# Patient Record
Sex: MALE | Race: WHITE | Employment: OTHER | URBAN - METROPOLITAN AREA
[De-identification: names, ages, dates, MRNs, and addresses within clinical notes are randomized per-mention and may not be internally consistent; named-entity substitution may affect disease eponyms.]

---

## 2021-04-09 ENCOUNTER — TRANSFERRED RECORDS (OUTPATIENT)
Dept: HEALTH INFORMATION MANAGEMENT | Facility: CLINIC | Age: 86
End: 2021-04-09

## 2021-04-16 ENCOUNTER — TRANSFERRED RECORDS (OUTPATIENT)
Dept: HEALTH INFORMATION MANAGEMENT | Facility: CLINIC | Age: 86
End: 2021-04-16

## 2021-04-16 ENCOUNTER — MEDICAL CORRESPONDENCE (OUTPATIENT)
Dept: HEALTH INFORMATION MANAGEMENT | Facility: CLINIC | Age: 86
End: 2021-04-16

## 2021-04-19 ENCOUNTER — TELEPHONE (OUTPATIENT)
Dept: OPHTHALMOLOGY | Facility: CLINIC | Age: 86
End: 2021-04-19

## 2021-04-19 ENCOUNTER — TRANSCRIBE ORDERS (OUTPATIENT)
Dept: OTHER | Age: 86
End: 2021-04-19

## 2021-04-19 DIAGNOSIS — C44.111 BASAL CELL CARCINOMA (BCC) OF LEFT EYELID: Primary | ICD-10-CM

## 2021-04-19 NOTE — TELEPHONE ENCOUNTER
Spoke with patient's wife regarding referral to Eye Clinic for Basal cell carcinoma (BCC) of left area where nose and eye meet and collects eye matter-within 2 weeks Biopsy was positive done with  at [facility] on 4/7/2021. Scheduled patient accordingly and sent appointment letter and map to email provided. Completed intake questions and will patient tomorrow for Insurance Information.-Per Patient's Wife

## 2021-04-19 NOTE — TELEPHONE ENCOUNTER
[facility] Call Center    Phone Message    May a detailed message be left on voicemail: no     Reason for Call: Appointment Intake    Referring Provider Name: Lalit Lowe from [facility] referring to Dr. Coy  Diagnosis and/or Symptoms: BCC, left eyelid    Requesting appointment within 2 weeks    Action Taken: Message routed to:  [facility] ([facility]): [facility] OPHTHALMOLOGY ADULT [facility] [[id number]]    Travel Screening: Not Applicable

## 2021-04-20 NOTE — TELEPHONE ENCOUNTER
FUTURE VISIT INFORMATION      FUTURE VISIT INFORMATION:    Date: 4/27/21    Time: 10:00am    Location: [facility]  REFERRAL INFORMATION:    Referring provider:  Dr. Lowe    Referring providers clinic:  [facility]    Reason for visit/diagnosis  BCC    RECORDS REQUESTED FROM:       Clinic name Comments Records Status Imaging Status   [facility] Request for recs/ Path sent 4/20- received and sent to scanning 4/21 EPIC

## 2021-04-27 ENCOUNTER — HOSPITAL ENCOUNTER (OUTPATIENT)
Facility: CLINIC | Age: 86
End: 2021-04-27
Attending: OPHTHALMOLOGY | Admitting: OPHTHALMOLOGY
Payer: MEDICARE

## 2021-04-27 ENCOUNTER — TELEPHONE (OUTPATIENT)
Dept: OPHTHALMOLOGY | Facility: CLINIC | Age: 86
End: 2021-04-27

## 2021-04-27 ENCOUNTER — OFFICE VISIT (OUTPATIENT)
Dept: OPHTHALMOLOGY | Facility: CLINIC | Age: 86
End: 2021-04-27
Attending: OPHTHALMOLOGY
Payer: MEDICARE

## 2021-04-27 ENCOUNTER — PRE VISIT (OUTPATIENT)
Dept: OPHTHALMOLOGY | Facility: CLINIC | Age: 86
End: 2021-04-27

## 2021-04-27 DIAGNOSIS — C44.111 BASAL CELL CARCINOMA (BCC) OF LEFT EYELID: ICD-10-CM

## 2021-04-27 PROBLEM — C44.91 NODULAR BASAL CELL CARCINOMA (BCC): Status: ACTIVE | Noted: 2021-04-27

## 2021-04-27 PROBLEM — Z98.890 STATUS POST YAG CAPSULOTOMY OF BOTH EYES: Status: ACTIVE | Noted: 2018-11-12

## 2021-04-27 PROBLEM — R73.03 PRE-DIABETES: Status: ACTIVE | Noted: 2020-07-16

## 2021-04-27 PROBLEM — H52.229 REGULAR ASTIGMATISM: Status: ACTIVE | Noted: 2018-09-17

## 2021-04-27 PROBLEM — N18.30 STAGE 3 CHRONIC KIDNEY DISEASE (H): Status: ACTIVE | Noted: 2021-04-27

## 2021-04-27 PROBLEM — E11.9 DIABETES MELLITUS (H): Status: ACTIVE | Noted: 2021-04-27

## 2021-04-27 PROBLEM — Z23 ENCOUNTER FOR IMMUNIZATION: Status: ACTIVE | Noted: 2021-02-18

## 2021-04-27 PROBLEM — M19.90 OSTEOARTHRITIS: Status: ACTIVE | Noted: 2021-04-27

## 2021-04-27 PROBLEM — I10 HYPERTENSION: Status: ACTIVE | Noted: 2020-07-16

## 2021-04-27 PROBLEM — Z96.1 PSEUDOPHAKIA: Status: ACTIVE | Noted: 2017-01-20

## 2021-04-27 PROBLEM — H40.009 GLAUCOMA SUSPECT: Status: ACTIVE | Noted: 2021-04-27

## 2021-04-27 PROBLEM — R42 VERTIGO: Status: ACTIVE | Noted: 2021-04-27

## 2021-04-27 PROBLEM — H52.4 PRESBYOPIA: Status: ACTIVE | Noted: 2021-04-27

## 2021-04-27 PROBLEM — G47.00 INSOMNIA: Status: ACTIVE | Noted: 2021-04-27

## 2021-04-27 PROBLEM — M54.50 LOW BACK PAIN: Status: ACTIVE | Noted: 2021-04-27

## 2021-04-27 PROBLEM — H11.30 SUBCONJUNCTIVAL HEMORRHAGE: Status: ACTIVE | Noted: 2020-02-25

## 2021-04-27 PROBLEM — D48.5 NEOPLASM OF UNCERTAIN BEHAVIOR OF SKIN OF EYELID: Status: ACTIVE | Noted: 2021-04-27

## 2021-04-27 PROCEDURE — 99204 OFFICE O/P NEW MOD 45 MIN: CPT | Performed by: OPHTHALMOLOGY

## 2021-04-27 PROCEDURE — 92285 EXTERNAL OCULAR PHOTOGRAPHY: CPT | Performed by: OPHTHALMOLOGY

## 2021-04-27 RX ORDER — WARFARIN SODIUM 7.5 MG/1
TABLET ORAL
COMMUNITY
Start: 2021-04-05

## 2021-04-27 RX ORDER — METOPROLOL SUCCINATE 50 MG/1
50 TABLET, EXTENDED RELEASE ORAL DAILY
COMMUNITY
Start: 2021-04-17

## 2021-04-27 RX ORDER — DILTIAZEM HYDROCHLORIDE 240 MG/1
1 CAPSULE, EXTENDED RELEASE ORAL EVERY 24 HOURS
COMMUNITY
Start: 2020-05-11

## 2021-04-27 RX ORDER — LOSARTAN POTASSIUM AND HYDROCHLOROTHIAZIDE 25; 100 MG/1; MG/1
1 TABLET ORAL DAILY
COMMUNITY
Start: 2021-04-01

## 2021-04-27 ASSESSMENT — TONOMETRY
OD_IOP_MMHG: 18
IOP_METHOD: ICARE
OS_IOP_MMHG: 20

## 2021-04-27 ASSESSMENT — VISUAL ACUITY
OD_SC: 20/20
METHOD: SNELLEN - LINEAR
OD_SC+: -1
OS_SC+: -1
OS_SC: 20/40

## 2021-04-27 ASSESSMENT — CONF VISUAL FIELD
OD_NORMAL: 1
METHOD: COUNTING FINGERS
OS_NORMAL: 1

## 2021-04-27 NOTE — NURSING NOTE
"Chief Complaints and History of Present Illnesses   Patient presents with     Consult For     BCC LE     Chief Complaint(s) and History of Present Illness(es)     Consult For     Laterality: left eye    Associated symptoms: Negative for eye pain, itching, discharge and burning    Treatments tried: no treatments    Pain scale: 0/10    Comments: BCC LE              Comments     Yovanny Welch is being seen for a consult today by the request of Dr. Lowe for Basal cell carcinoma.  Pt notes that \"spot' was present for a few months at least before being bx, not having any irritation, discharge, tearing, or mattering, used EES jessy for about 4 days after bx, not using anything currently.    Veronica Gill COT April 27, 2021 9:59 AM                    "

## 2021-04-27 NOTE — LETTER
" 2021         RE:  :  MRN: Yovanny Welch  10/26/1930  [id number]     Dear Dr. Lowe,    Thank you for asking me to see your patient, Yovanny Welch, for an oculoplastic   consultation.  My assessment and plan are below.  For further details, please see my attached clinic note.      CC: Basal Cell Carcinoma     Chief Complaint(s) and History of Present Illness(es)     Consult For     Laterality: left eye    Associated symptoms: Negative for eye pain, itching, discharge and   burning    Treatments tried: no treatments    Pain scale: 0/10    Comments: BCC LE           Comments     Yovanny Welch is being seen for a consult today by the request of Dr. Lowe for Basal cell carcinoma.  Pt notes that \"spot' was present for a few   months at least before being bx, not having any irritation, discharge,   tearing, or mattering, used EES jessy for about 4 days after bx, not using   anything currently.    Veronica Gill COT 2021 9:59 AM            HPI:     Enlarging: Yes  Irritating to eyelashes and catches in folds of skin: No  Bleeding: Yes  Prior cutaneous malignancy: Yes  Immunosuppression: No    Patient is a 89 y/o M who presents to the oculoplastics clinic in the setting of a basal cell carcinoma on the left side of upper nose.     Reports that he has been putting erythromycin ointment to the lesion.    Patient reports that the lesion has been present I the medial aspect of nose, medial to the left eye - for perhaps a year. He reports the lesion was slowly growing in size. He denies ay pain in the area. The lesion had bleeding one time, and it felt like it \"drained\", then the lesion became bigger again. He denies any irritation in the area prior to the removal.      Reports that he wears glasses - and initially thought it was an irritation of the lens. He reports a hx of possibly a precancerous lesion x 2 - one in his left arm, and one on his scalp - one was excised and the other frozen off. No other cancer " hx and no family history of this.     Has a hx of CE IOL - with symphony.     Dr. Lowe perfomed an excisional biopsy of the lesion.     On Warfarin and has pacemaker.            Assessment and Plan:   1. 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 Biopsy results depict this represents BCC - nodular subtype.   - Lesion has been present for about a year at this juncture - with associated bleeding.    Pathology result: 4/9/2021  Nodular type- Basal Cell Carcinoma - extending to a peripheral margin.     Plan:  - Given lesion, will plan for Mohs evaluation and possible reconstruction following removal.     Luis Hsu MD  Department of Ophthalmology  Pager: [phone]    Plan Mohs left medial canthus, and reconstruction.   Discussed possibility of lacrimal involvement.       Again, thank you for allowing me to participate in the care of your patient.      Sincerely,    Aliyah Coy MD  Department of Ophthalmology and Visual Neurosciences  [facility]    CC: Lalit Lowe  [facility]  [address] [facility] [place] [address]  Via Fax: [phone]

## 2021-04-27 NOTE — PROGRESS NOTES
"Oculoplastic Clinic New Patient    Patient: Yovanny Welch MRN# [id number]   YOB: 1930 Age: 89 year old   Date of Visit: Apr 27, 2021         CC: Basal Cell Carcinoma     Chief Complaint(s) and History of Present Illness(es)     Consult For     Laterality: left eye    Associated symptoms: Negative for eye pain, itching, discharge and   burning    Treatments tried: no treatments    Pain scale: 0/10    Comments: BCC LE           Comments     Yovanny Welch is being seen for a consult today by the request of Dr. Lowe for Basal cell carcinoma.  Pt notes that \"spot' was present for a few   months at least before being bx, not having any irritation, discharge,   tearing, or mattering, used EES jessy for about 4 days after bx, not using   anything currently.    Veronica Gill COT April 27, 2021 9:59 AM            HPI:     Enlarging: Yes  Irritating to eyelashes and catches in folds of skin: No  Bleeding: Yes  Prior cutaneous malignancy: Yes  Immunosuppression: No    Patient is a 89 y/o M who presents to the oculoplastics clinic in the setting of a basal cell carcinoma on the left side of upper nose.     Reports that he has been putting erythromycin ointment to the lesion.    Patient reports that the lesion has been present I the medial aspect of nose, medial to the left eye - for perhaps a year. He reports the lesion was slowly growing in size. He denies ay pain in the area. The lesion had bleeding one time, and it felt like it \"drained\", then the lesion became bigger again. He denies any irritation in the area prior to the removal.      Reports that he wears glasses - and initially thought it was an irritation of the lens. He reports a hx of possibly a precancerous lesion x 2 - one in his left arm, and one on his scalp - one was excised and the other frozen off. No other cancer hx and no family history of this.     Has a hx of CE IOL - with symphony.     Dr. Lowe perfomed an excisional biopsy of the lesion. "     On Warfarin and has pacemaker.            Assessment and Plan:   1. 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 Biopsy results depict this represents BCC - nodular subtype.   - Lesion has been present for about a year at this juncture - with associated bleeding.    Pathology result: 4/9/2021  Nodular type- Basal Cell Carcinoma - extending to a peripheral margin.     Plan:  - Given lesion, will plan for Mohs evaluation and possible reconstruction following removal.     Luis Hsu MD  Department of Ophthalmology  Pager: [phone]    Plan Mohs left medial canthus, and reconstruction.   Discussed possibility of lacrimal involvement.     Will need to hold coumadin INR<2  Has pacemaker.        Attending Physician Attestation: Complete documentation of historical and exam elements from today's encounter can be found in the full encounter summary report (not reduplicated in this progress note). I personally obtained the chief complaint(s) and history of present illness. I confirmed and edited as necessary the review of systems, past medical/surgical history, family history, social history, and examination findings as documented by others; and I examined the patient myself. I personally reviewed the relevant tests, images, and reports as documented above. I formulated and edited as necessary the assessment and plan and discussed the findings and management plan with the patient. Aliyah Coy MD    Today with Yovanny Welch and his wife Madelyn, I reviewed the indications, risks, benefits, and alternatives of the proposed surgical procedure including, but not limited to, failure obtain the desired result  and need for additional surgery, bleeding, infection, injury to the eye, and the remote possibility of permanent damage to any organ system with the use of anesthesia. Also discussed possibility of  need to reconstruct lacrimal system. I provided multiple opportunities for the questions, answered all questions to the best of my ability, and confirmed that my answers and my discussion were understood. Aliyah Coy MD

## 2021-04-27 NOTE — TELEPHONE ENCOUNTER
Spoke with patient to schedule surgery with Dr. Coy  Surgery was scheduled on 6/4 at [facility]  Patient will have H&P at [facility]     Patient is aware a COVID-19 test is needed before their procedure. The test should be with-in 4 days of their procedure.   Test Details: Date 6/1 Location [facility]    Post-Op visit was scheduled on 6/22  Patient is aware a / is needed day of surgery.   Surgery packet was mailed 4/27, patient has my direct contact information for any further questions.

## 2021-04-29 NOTE — TELEPHONE ENCOUNTER
FUTURE VISIT INFORMATION      FUTURE VISIT INFORMATION:    Date: 6.3.21    Time: 8:30    Location: [facility]  REFERRAL INFORMATION:    Referring provider:  Dr. Leonardo Lowe    Referring providers clinic:  [facility]    Reason for visit/diagnosis  BCC Left Eye, Dr. Coy to do procedure Next day at [facility]     RECORDS REQUESTED FROM:       Clinic name Comments Records Status Photos Status   [facility] Ophthalmology 4.27.21  Dr. Coy [facility]   [facility] 4.9.21  Dr. Leonardo Lowe  Path # [id number] Received  Sent to scanning na                             Action 4.29.21 jm   Action Taken Faxed records/photos request to [facility] at [phone]    Records received- sent to scanning. Also put in Dr. Orozco's rightx medical records tab.

## 2021-05-19 DIAGNOSIS — Z11.59 ENCOUNTER FOR SCREENING FOR OTHER VIRAL DISEASES: ICD-10-CM

## 2021-06-03 ENCOUNTER — OFFICE VISIT (OUTPATIENT)
Dept: DERMATOLOGY | Facility: CLINIC | Age: 86
End: 2021-06-03
Payer: MEDICARE

## 2021-06-03 ENCOUNTER — PRE VISIT (OUTPATIENT)
Dept: DERMATOLOGY | Facility: CLINIC | Age: 86
End: 2021-06-03

## 2021-06-03 VITALS — HEART RATE: 90 BPM | DIASTOLIC BLOOD PRESSURE: 82 MMHG | SYSTOLIC BLOOD PRESSURE: 125 MMHG

## 2021-06-03 DIAGNOSIS — C44.1192 BASAL CELL CARCINOMA (BCC) OF LEFT LOWER EYELID: Primary | ICD-10-CM

## 2021-06-03 PROCEDURE — 14060 TIS TRNFR E/N/E/L 10 SQ CM/<: CPT | Performed by: DERMATOLOGY

## 2021-06-03 PROCEDURE — 17311 MOHS 1 STAGE H/N/HF/G: CPT | Performed by: DERMATOLOGY

## 2021-06-03 ASSESSMENT — PAIN SCALES - GENERAL: PAINLEVEL: NO PAIN (0)

## 2021-06-03 NOTE — NURSING NOTE
Chief Complaint   Patient presents with     Derm Problem     Patient is here today for mohs on left eye BCC.      Yazmin VOSS CMA

## 2021-06-03 NOTE — PATIENT INSTRUCTIONS
Wound care    I will experience scar, bleeding, swelling, pain, crusting and redness. I may experience incomplete removal or recurrence. Risks are bleeding, bruising, infection, nerve damage, & large wound. A second procedure may be recommended to obtain the best cosmetic or functional result. A three month office visit with Surgeon is recommended for scar evaluation.     Caring for your skin after surgery    After your surgery, a pressure bandage will be placed over the area that has stitches. This will prevent bleeding. Please follow these instructions over the next 1 to 2 weeks. Following this regimen will help to prevent complications as your wound heals.     For the first 48 hours after your surgery:      Leave the pressure dressing on and keep it dry. If it should come loose, you may re-tape it, but do not take it off.    Relax and take it easy. Do not do any vigorous exercise or heavy lifting. This could cause the wound to bleed.    Post-Operative pain is usually mild. You may take plain or extra-strength Tylenol (acetaminophen) (do not take more than 4,000mg in one day) every 4 hours as needed.     Do not take any medicine that contains aspirin, ibuprofen or motrin unless you have been recommended these by a doctor.      Avoid alcohol and vitamin E as these may increase your tendency to bleed.     You may put an ice pack around the bandaged area for 20 minutes at a time as needed. This may help reduce swelling, bruising, and pain. Make sure the ice pack is waterproof so that the pressure bandage doesn t get wet.    If the wound is on the face try you sleep with your head elevated. Either in a recliner or propped up in bed, this will decrease swelling around the eyes.     You may see a small amount of drainage or blood on your pressure bandage. This is normal. However:  o If drainage or bleeding continues or saturates the bandage, you will need to apply firm pressure over the bandage with a piece of gauze for  15 minutes.  o If bleeding continues after applying pressure for 15 minutes, apply an ice pack to the bandaged area for 15 minutes.  o If bleeding still continues, call our office or go to the nearest emergency room.    Remove pressure dressing 48 hours after surgery on left side of nose/eye :      Carefully remove the pressure bandage. If it seems sticky or too difficult to get off, you may need to soak it off in the shower.    After the pressure dressing is removed, you may shower and get the wound wet. However, Do Not let the forceful stream of the shower hit the wound directly.    Follow these wound care and dressing change instructions:  o  You may allow water to run over the site. Take a clean wash cloth wet with soapy warm water and gently pat the suture site to help remove any crust or drainage.   o Do Not rub or scrub the site    o After site is clean pat dry and apply a thin layer of Vaseline ointment  over the suture site with a cotton swab.  o Cover the suture site with Telfa (non-stick) dressing. You may tape a piece of gauze over the Telfa for extra protection if you wish.  o Continue the wound care and dressing changes every day until you come back to have your stitches taken out. Or if you have dissolving stitches please continue wound care for one week.   o Dissolving stitches, if you have been told your stitches are dissolving they should dissolve in one week. If the stiches do not dissolve in one week you can use a Qtip with hydrogen peroxide on it and roll it along the suture line to help the stitches dissolve and come out. Please give us a call if stitches are still in after one week.     What to expect:      The first couple of days your wound may be tender and may bleed slightly when doing wound care.    There may be swelling and bruising around the wound, especially if it is near the eyes. For your comfort, you may apply ice or cold compresses to the bruises after your have removed the  pressure bandage.    The area around your wound may be numb for several weeks or even months.    You may experience periodic sharp pain or mild itching around the wound as it heals.     The suture line will look dark pink at first and the edges of the wound will be reddened. This will lighten up each day.      When to call us:      You have bleeding that will not stop after applying pressure and ice.    You have pain that is not controlled with Tylenol (acetaminophen.)    You have signs or symptoms of an infection such as:  o Fever over 100 degrees Fahrenheit  o Redness, warmth or foul-smelling drainage from the wound  o If you have any questions, or are not sure how to take care of the wound.    Phone numbers:    During business hours (M-F 8:00-4:30 p.m.)  [facility]-  [phone] Option 1 appt. desk  [phone]  Option 3 nurse triage line  ---------------------------------------------------------  Evenings/Weekends/Holidays  Hospital - [phone]   TTY for hearing [id number]-[phone]  *Ask  to page dermatologist on-call  Emergency Kvja-[phone]  TTY for hearing impaired- [phone]

## 2021-06-03 NOTE — NURSING NOTE
Drug Administration Record     verified patient identity using patient's name and date of birth.   patient instructed to remain in clinic for 15 minutes  afterwards, and to report any adverse reaction to me immediately.    Drug Name: erythromycin ophthalmic oint. 0.5%  Dose: 5mg/g   Route administered: topical  NDC #: 4959021606  Amount of waste(mL):0  Reason for waste: Single use    LOT #: [id number]  SITE: left eye  : [employer]  EXPIRATION DATE: 07/22      Drug Administration Record    verified patient identity using patient's name and date of birth.  patient instructed to remain in clinic for 15 minutes  afterwards, and to report any adverse reaction to me immediately.    Drug Name: proparacaine  Dose: 0.5% sol  Route administered: drop  NDC #: 89201-04-13  Amount of waste(mL): 15 ml   Reason for waste: medication as pulled from pyxis and in patient room while procedure was done, unable to return to xis. Medication was not used, discarded.     LOT #: [id number]  SITE: none  : [employer]  EXPIRATION DATE: 08/2022

## 2021-06-03 NOTE — LETTER
6/3/2021       RE: Yovanny Welch  [address] [facility] [address]     Dear Colleague,    Thank you for referring your patient, Yovanny Welch, to the [facility] DERMATOLOGIC SURG[facility]IC [place] at [facility]. Please see a copy of my visit note below.    [facility]at[facility] [place] Procedure Note      Date of Service:  Cornelio 3, 2021  Surgery: Mohs micrographic surgery    Case 1  Repair Type: transposition flap  Repair Size: 2x3 cm  Suture Material: 5.0 monocryl, 5.0 FAG  Tumor Type: BCC  Location: L eye  Derm-Path Accession #: [facility]  PreOp Size: 1.5x1.5 cm  PostOp Size: 1.6x1.6 cm  Mohs Accession #:   Level of Defect: fascia      Procedure:  We discussed the principles of treatment and most likely complications including scarring, bleeding, infection, swelling, pain, crusting, nerve damage, large wound,  incomplete excision, wound dehiscence,  nerve damage, recurrence, and a second procedure may be recommended to obtain the best cosmetic or functional result.    Informed consent was obtained and the patient underwent the procedure as follows:  The patient was placed supine on the operating table.  The cancer was identified, outlined with a marker, and verified by the patient.  The entire surgical field was prepped with iodine.  The surgical site was anesthetized using lidocaine with 1% epinephrine.      The area of clinically apparent tumor was debulked. The layer of tissue was then surgically excised using a #15 blade and was then transferred onto a specimen sheet maintaining the orientation of the specimen. Hemostasis was obtained using bipolar electrocoagulation. The wound site was then covered with a dressing while the tissue samples were processed for examination.    [facility] maintaining the tissue orientation.  The tissue specimen was relaxed  so that the entire surgical margin was in a a single horizontal plane for sectioning and inked for precise mapping.  A precise reference map was drawn to reflect the sectioning of the specimen, colored inking of the margins, and orientation on the patient. The tissue was processed using horizontal sectioning of the base and continuous peripheral margins.  The histopathologic sections were reviewed in conjunction with the reference map.    Total blocks: 1    Total slides:  2    There were no cancer cells visualized on examination, therefore Mohs surgery was complete.    PROCEDURE: Rhombic Transposition Flap  The patient was taken to the operative suite and placed supine on the operating room table.  The wound was identified and infiltrated with 1%lidocaine with epinephrine.  The defect was then cleansed and prepped with iodine and draped with sterile drapes.  Using a marker, a rhomboid transposition flap repair was planned.  The wound edges were then debeveled and the wound was undermined bluntly in all directions.  The transposition flap was incised sharply to the level of fat.  The flap was undermined from all surrounding tissue. Hemostasis was obtained with bipolar electrocoagulation.  The flap was transposed into the primary defect.  The secondary defect and flap closed with deep dermal vicryl sutures Epidermal tissue was carefully approximated using 5.0 FAG epidermal sutures throughout the length of the flap.  Redundant skin was excised by the triangulation technique, and closed in similar fashion.  The wound was cleansed with sterile saline and polysporin was applied. A sterile non-adherent pressure dressing was placed.  The patient left the operating suite in stable condition.  The patient will return in seven to ten days for suture removal. Wound care was reviewed verbally and in writing. Anticipate Dermabrasion to be used as a second stage of this reconstruction.     Dr. Orozco was present for the entire  procedure and always immediately available.    Attending attestation:  I personally performed the entire procedure.  I have reviewed the note and edited it as necessary, and agree with its contents.    Fabian Orozco M.D.  Professor  Director of Dermatologic Surgery  Department of Dermatology  [facility]    Dermatology Surgery Clinic  [facility]  [address] [place], [place] [id number]

## 2021-06-03 NOTE — TELEPHONE ENCOUNTER
Per Dr. Coy dermatology took care of the patient and closed the whole. So the patients eye procedure that was scheduled with Dr. Schmitz is no longer needed.    Patient has been removed from the surgery schedule all post-op follow up appointments have been cancelled.

## 2021-06-04 NOTE — PROGRESS NOTES
[facility] Dermatologic Surgery Clinic [place] Procedure Note      Date of Service:  Cornelio 3, 2021  Surgery: Mohs micrographic surgery    Case 1  Repair Type: transposition flap  Repair Size: 2x3 cm  Suture Material: 5.0 monocryl, 5.0 FAG  Tumor Type: BCC  Location: L eye  Derm-Path Accession #: [facility]  PreOp Size: 1.5x1.5 cm  PostOp Size: 1.6x1.6 cm  Mohs Accession #:   Level of Defect: fascia      Procedure:  We discussed the principles of treatment and most likely complications including scarring, bleeding, infection, swelling, pain, crusting, nerve damage, large wound,  incomplete excision, wound dehiscence,  nerve damage, recurrence, and a second procedure may be recommended to obtain the best cosmetic or functional result.    Informed consent was obtained and the patient underwent the procedure as follows:  The patient was placed supine on the operating table.  The cancer was identified, outlined with a marker, and verified by the patient.  The entire surgical field was prepped with iodine.  The surgical site was anesthetized using lidocaine with 1% epinephrine.      The area of clinically apparent tumor was debulked. The layer of tissue was then surgically excised using a #15 blade and was then transferred onto a specimen sheet maintaining the orientation of the specimen. Hemostasis was obtained using bipolar electrocoagulation. The wound site was then covered with a dressing while the tissue samples were processed for examination.    The excised tissue was transported to the Mohs histology laboratory maintaining the tissue orientation.  The tissue specimen was relaxed so that the entire surgical margin was in a a single horizontal plane for sectioning and inked for precise mapping.  A precise reference map was drawn to reflect the sectioning of the specimen, colored inking of the margins, and orientation on the patient. The tissue was processed using horizontal sectioning of the base and  continuous peripheral margins.  The histopathologic sections were reviewed in conjunction with the reference map.    Total blocks: 1    Total slides:  2    There were no cancer cells visualized on examination, therefore Mohs surgery was complete.    PROCEDURE: Rhombic Transposition Flap  The patient was taken to the operative suite and placed supine on the operating room table.  The wound was identified and infiltrated with 1%lidocaine with epinephrine.  The defect was then cleansed and prepped with iodine and draped with sterile drapes.  Using a marker, a rhomboid transposition flap repair was planned.  The wound edges were then debeveled and the wound was undermined bluntly in all directions.  The transposition flap was incised sharply to the level of fat.  The flap was undermined from all surrounding tissue. Hemostasis was obtained with bipolar electrocoagulation.  The flap was transposed into the primary defect.  The secondary defect and flap closed with deep dermal vicryl sutures Epidermal tissue was carefully approximated using 5.0 FAG epidermal sutures throughout the length of the flap.  Redundant skin was excised by the triangulation technique, and closed in similar fashion.  The wound was cleansed with sterile saline and polysporin was applied. A sterile non-adherent pressure dressing was placed.  The patient left the operating suite in stable condition.  The patient will return in seven to ten days for suture removal. Wound care was reviewed verbally and in writing. Anticipate Dermabrasion to be used as a second stage of this reconstruction.     Dr. Orozco was present for the entire procedure and always immediately available.    Attending attestation:  I personally performed the entire procedure.  I have reviewed the note and edited it as necessary, and agree with its contents.    Fabian Orozco M.D.  Professor  Director of Dermatologic Surgery  Department of Dermatology  [facility]  [place]    Dermatology Surgery Clinic  [facility]  [address] [place] [address]

## 2021-06-09 ENCOUNTER — TELEPHONE (OUTPATIENT)
Dept: DERMATOLOGY | Facility: CLINIC | Age: 86
End: 2021-06-09

## 2021-06-09 ENCOUNTER — VIRTUAL VISIT (OUTPATIENT)
Dept: DERMATOLOGY | Facility: CLINIC | Age: 86
End: 2021-06-09
Payer: MEDICARE

## 2021-06-09 DIAGNOSIS — Z51.89 VISIT FOR WOUND CHECK: Primary | ICD-10-CM

## 2021-06-09 PROCEDURE — 99024 POSTOP FOLLOW-UP VISIT: CPT | Mod: 95 | Performed by: DERMATOLOGY

## 2021-06-09 ASSESSMENT — PAIN SCALES - GENERAL: PAINLEVEL: MILD PAIN (2)

## 2021-06-09 NOTE — LETTER
6/9/2021       RE: Yovanny Welch  [address] [address]     Dear Colleague,    Thank you for referring your patient, Yovanny Welch, to the [facility] DERMATOLOGIC SURGERY CLINIC [place] at [facility]. Please see a copy of my visit note below.    Dermatologic Surgery Note    Dermatology Surgery Clinic  [facility]  [address] [place] [id number]    Dermatology Problem List  (1) NMSC  - BCC, left eye (left medial canthus), s/p MMS and transposition flap reconstruction 6/3/2021    Subjective: Mr. Welch is a very pleasant 89 year old man with history of BCC of the left medial canthus who presents today for follow up after MMS for BCC.     Overall, the patient feels that the area is healing very well. He denies any pain, drainage or irritation of the globe of the eye.     Objective:   Gen: This is a well appearing man in no acute distress. Patient is alert and oriented x 3.  An exam of the photos submitted by the patient were performed today   - Over the left medial canthus is a well healing transposition flap with expected degree of post-operative erythema and edema.     Assessment and Plan:   (1) BCC, left eye (left medial canthus), s/p MMS and transposition flap reconstruction 6/3/2021  - Healing well, continue wound care 3-5 more days, notify our office if globe of eye irritated.   - Follow up 2-3 months for virtual visit.     Patient was discussed with and evaluated by attending physician Dr. Ernesto Kramer MD  PGY-[id number]    Micrographic Surgery and Dermatologic Oncology Fellow  June 9, 2021                Attestation signed by Fabian Orozco MD at 6/22/2021 10:37 AM:  Attending Attestation  I attest that the Fellow recorded the interview and exam that I personally performed.  I have reviewed the note and edited it as necessary.    Fabian Orozco M.D.  Professor  Director of  Dermatologic Surgery  Department of Dermatology  [facility]

## 2021-06-09 NOTE — NURSING NOTE
Chief Complaint   Patient presents with     RECHECK     pt states he is doing well, little redness      Veronica Hodge MA

## 2021-06-13 NOTE — PROGRESS NOTES
Dermatologic Surgery Note    Dermatology Surgery Clinic  [facility]  [address] [place] [id number]    Dermatology Problem List  (1) NMSC  - BCC, left eye (left medial canthus), s/p MMS and transposition flap reconstruction 6/3/2021    Subjective: Mr. Welch is a very pleasant 89 year old man with history of BCC of the left medial canthus who presents today for follow up after MMS for BCC.     Overall, the patient feels that the area is healing very well. He denies any pain, drainage or irritation of the globe of the eye.     Objective:   Gen: This is a well appearing man in no acute distress. Patient is alert and oriented x 3.  An exam of the photos submitted by the patient were performed today   - Over the left medial canthus is a well healing transposition flap with expected degree of post-operative erythema and edema.     Assessment and Plan:   (1) BCC, left eye (left medial canthus), s/p MMS and transposition flap reconstruction 6/3/2021  - Healing well, continue wound care 3-5 more days, notify our office if globe of eye irritated.   - Follow up 2-3 months for virtual visit.     Patient was discussed with and evaluated by attending physician Dr. Ernesto Kramer MD  PGY-6    Micrographic Surgery and Dermatologic Oncology Fellow  June 9, 2021

## 2021-08-10 ENCOUNTER — VIRTUAL VISIT (OUTPATIENT)
Dept: DERMATOLOGY | Facility: CLINIC | Age: 86
End: 2021-08-10
Payer: MEDICARE

## 2021-08-10 DIAGNOSIS — Z51.89 VISIT FOR WOUND CHECK: ICD-10-CM

## 2021-08-10 DIAGNOSIS — C44.1192 BASAL CELL CARCINOMA (BCC) OF LEFT LOWER EYELID: Primary | ICD-10-CM

## 2021-08-10 PROCEDURE — 99024 POSTOP FOLLOW-UP VISIT: CPT | Performed by: DERMATOLOGY

## 2021-08-10 NOTE — PROGRESS NOTES
Dermatologic Surgery Clinic Note - Teledermatology Visit    Aug 10, 2021  Start time: 2:30 p.m.  End time: 2:40 p.m.    Dermatology Problem List:  (1) NMSC  - BCC, left eye (left medial canthus), s/p MMS and transposition flap reconstruction 6/3/2021    Subjective: The patient is a 89 year old man who presents today for follow-up after recent dermatologic surgery/wound check. He reports some mild watering from his left eye still. The patient has no concerns for surgical wound at this time. Pain is well controlled. No concern for infection.    No other associated symptoms, modifying factors, or prior treatments, except when noted above. The patient denies any constitutional symptoms, lymphadenopathy, unintentional weight loss or decreased appetite. No other skin concerns today.    Objective:  The patient was unable to send photos.     Assessment and Plan:     - The patient's surgery site(s) is/are healing very well. No evidence of infection on examination today.  - The patient was told to continue with wound cares until the area(s) is/are no longer crusted.   - The patient should follow up with dermatologic surgery PRN, as well as continue with regular skin exams in general dermatology clinic.  - Regarding the mild eye watering, I counseled that this is likely due to postoperative changes. He will call in 2 months if not resolving.    The patient was discussed with and evaluated by attending physician, Fabian Orozco MD.    Esequiel Henson MD  Micrographic Surgery and Dermatologic Oncology (MSDO) Fellow    Scribe Disclosure:  IMartha, am serving as a scribe to document services personally performed by Fabian Orozco MD based on data collection and the provider's statements to me.

## 2021-08-10 NOTE — LETTER
8/10/2021       RE: Yovanny Welch  [address] [facility] [address]     Dear Colleague,    Thank you for referring your patient, Yovanny Welch, to the [facility] DERMATOLOGIC SURGERY CLINIC [place] at [facility]. Please see a copy of my visit note below.    Dermatologic Surgery Clinic Note - Teledermatology Visit    Aug 10, 2021  Start time: 2:30 p.m.  End time: 2:40 p.m.    Dermatology Problem List:  (1) NMSC  - BCC, left eye (left medial canthus), s/p MMS and transposition flap reconstruction 6/3/2021    Subjective: The patient is a 89 year old man who presents today for follow-up after recent dermatologic surgery/wound check. He reports some mild watering from his left eye still. The patient has no concerns for surgical wound at this time. Pain is well controlled. No concern for infection.    No other associated symptoms, modifying factors, or prior treatments, except when noted above. The patient denies any constitutional symptoms, lymphadenopathy, unintentional weight loss or decreased appetite. No other skin concerns today.    Objective:  The patient was unable to send photos.     Assessment and Plan:     - The patient's surgery site(s) is/are healing very well. No evidence of infection on examination today.  - The patient was told to continue with wound cares until the area(s) is/are no longer crusted.   - The patient should follow up with dermatologic surgery PRN, as well as continue with regular skin exams in general dermatology clinic.  - Regarding the mild eye watering, I counseled that this is likely due to postoperative changes. He will call in 2 months if not resolving.    The patient was discussed with and evaluated by attending physician, Fabian Orozco MD.    Esequiel Henson MD  Micrographic Surgery and Dermatologic Oncology (MSDO) Fellow    Scribe Disclosure:  I, Martha Irwin, am serving as a scribe to document services personally  performed by Fabian Orozco MD based on data collection and the provider's statements to me.     Attestation signed by Fabian Orozco MD at 8/11/2021 12:51 PM:  Attending Attestation  I attest that the Fellow recorded the interview and exam that I personally performed.  I have reviewed the note and edited it as necessary.    Fabian Orozco M.D.  Professor  Director of Dermatologic Surgery  Department of Dermatology  [facility]        Again, thank you for allowing me to participate in the care of your patient.      Sincerely,    Fabian Orozco MD

## 2021-08-10 NOTE — LETTER
Date:August 18, 2021      Patient was self referred, no letter generated. Do not send.        [facility] Health Information

## (undated) RX ORDER — ERYTHROMYCIN 5 MG/G
OINTMENT OPHTHALMIC
Status: DISPENSED
Start: 2021-06-03

## (undated) RX ORDER — PROPARACAINE HYDROCHLORIDE 5 MG/ML
SOLUTION/ DROPS OPHTHALMIC
Status: DISPENSED
Start: 2021-06-03